# Patient Record
Sex: FEMALE | Race: NATIVE HAWAIIAN OR OTHER PACIFIC ISLANDER | NOT HISPANIC OR LATINO | Employment: STUDENT | URBAN - METROPOLITAN AREA
[De-identification: names, ages, dates, MRNs, and addresses within clinical notes are randomized per-mention and may not be internally consistent; named-entity substitution may affect disease eponyms.]

---

## 2021-10-01 LAB
ALT SERPL-CCNC: 15 U/L (ref 14–59)
AST SERPL-CCNC: 14 U/L (ref 0–45)
CREATININE (EXTERNAL): 0.78 MG/DL (ref 0.55–1.02)
GFR ESTIMATED (EXTERNAL): 130 ML/MIN/1.73M2
GFR ESTIMATED (IF AFRICAN AMERICAN) (EXTERNAL): 158 ML/MIN/1.73M2
GLUCOSE (EXTERNAL): 99 MG/DL (ref 70–124)
POTASSIUM (EXTERNAL): 4.5 MMOL/L (ref 3.4–5.3)

## 2021-12-15 LAB
ALT SERPL-CCNC: 19 U/L (ref 14–59)
AST SERPL-CCNC: 15 U/L (ref 0–45)
CREATININE (EXTERNAL): 0.73 MG/DL (ref 0.55–1.02)
GFR ESTIMATED (EXTERNAL): 140 ML/MIN/1.73M2
GFR ESTIMATED (IF AFRICAN AMERICAN) (EXTERNAL): 170 ML/MIN/1.73M2
GLUCOSE (EXTERNAL): 92 MG/DL (ref 70–124)
POTASSIUM (EXTERNAL): 4.5 MMOL/L (ref 3.4–5.3)

## 2022-01-03 ENCOUNTER — MEDICAL CORRESPONDENCE (OUTPATIENT)
Dept: HEALTH INFORMATION MANAGEMENT | Facility: CLINIC | Age: 27
End: 2022-01-03

## 2022-04-01 LAB
ALT SERPL-CCNC: 17 U/L (ref 14–59)
AST SERPL-CCNC: 14 U/L (ref 0–45)
CREATININE (EXTERNAL): 1.06 MG/DL (ref 0.55–1.02)
GFR ESTIMATED (EXTERNAL): >60 ML/MIN/1.73M2
GLUCOSE (EXTERNAL): 108 MG/DL (ref 70–124)
POTASSIUM (EXTERNAL): 4.8 MMOL/L (ref 3.4–5.3)

## 2022-09-30 ENCOUNTER — TELEPHONE (OUTPATIENT)
Dept: PLASTIC SURGERY | Facility: CLINIC | Age: 27
End: 2022-09-30

## 2022-09-30 DIAGNOSIS — F64.0 GENDER DYSPHORIA IN ADOLESCENT AND ADULT: Primary | ICD-10-CM

## 2022-09-30 NOTE — TELEPHONE ENCOUNTER
[facility] :  Care Coordination Note     SITUATION   Hernando Brunner (she/her) is a 26 year old adult who is receiving support for:  Care Team  .    BACKGROUND     Pt is scheduled for orchiectomy consultation with Dr. Gonzalez on 11/04/22 at 8:45 am.     Pt reports 1 year and 4 months on HRT.     ASSESSMENT     Surgery              [facility] Assessment  [facility] Enrollment: Enrolled  Patient has a therapist: Yes  Name of therapist: Prisca Huggins  Letter of support #1: Requested  Letter of support #2: Requested  Surgery being considered: Yes  Orchiectomy: Yes  Orchiectomy completed: No          PLAN          Nursing Interventions:      Follow-up plan:    1. Obtain 2 LOS  2. Writer to send  referrals via [employer].       Abigail Johnston

## 2022-10-03 ENCOUNTER — HEALTH MAINTENANCE LETTER (OUTPATIENT)
Age: 27
End: 2022-10-03

## 2022-10-04 NOTE — TELEPHONE ENCOUNTER
Action 2022 JTV 3:41 PM    Action Taken CSS called patient. Patient did not . [facility] LVM for patient to return call.     [facility] sent an urgent request to [facility] for records.      Action 2022 JTV 8:38 AM    Action Taken Patient returned call and confirmed that he only has one letter from a therapist that will be completed this week. Patient states he will fax over the letter once it is completed. Patient will contact [facility] to get his records released.      Action 2022 JTV 11:47 AM    Action Taken CSS called patient. Patient is meeting with her Therapist on Wednesday and will get the letter to the clinic after. [facility] emailed the patient a blank [facility] form to complete for [facility].     [facility] sent a request to [facility] for records.      Action 2022 JTV 3:55 PM    Action Taken Records received from [facility]. Records were sent to [facility].      MEDICAL RECORDS REQUEST   [facility]  Urology Clinic  [address] [place] [address]  PHONE: [phone]  Fax: [phone]        FUTURE VISIT INFORMATION                                                   Fa Leatha Brunner, : 1995 scheduled for future visit at [facility]    APPOINTMENT INFORMATION:    Date: 2022    Provider:  Stuart Gonzalez MD    Reason for Visit/Diagnosis: orchiectomy    REFERRAL INFORMATION:    Referring provider:  Stuart Gonzalez MD in [facility]    RECORDS REQUESTED FOR VISIT                                                     NOTES  STATUS/DETAILS   OFFICE NOTE from other specialist  yes, 10/17/2022, 10/10/2022, 2022, 2022, 10/11/2021 -- Cyndee Rhodes PA-C @ [facility]    2022 -- Mary Mitchell MD @ [email or web address]    10/24/2022 -- Prisca MART @ [facility]    MEDICATION LIST  yes     PRE-VISIT CHECKLIST      Record collection complete yes   Appointment appropriately scheduled            (right time/right provider) Yes   Joint diagnostic appointment coordinated correctly          (ensure right order & amount of time) Yes   MyChart activation Yes   Questionnaire complete If no, please explain pending

## 2022-10-06 ENCOUNTER — TRANSFERRED RECORDS (OUTPATIENT)
Dept: HEALTH INFORMATION MANAGEMENT | Facility: CLINIC | Age: 27
End: 2022-10-06

## 2022-10-06 LAB
ALT SERPL-CCNC: 21 U/L (ref 14–59)
AST SERPL-CCNC: 11 U/L (ref 0–45)
CREATININE (EXTERNAL): 0.77 MG/DL (ref 0.55–1.02)
GFR ESTIMATED (EXTERNAL): >60 ML/MIN/1.73M2
GLUCOSE (EXTERNAL): 92 MG/DL (ref 70–124)
POTASSIUM (EXTERNAL): 4.3 MMOL/L (ref 3.4–5.3)

## 2022-10-17 ENCOUNTER — TRANSFERRED RECORDS (OUTPATIENT)
Dept: HEALTH INFORMATION MANAGEMENT | Facility: CLINIC | Age: 27
End: 2022-10-17

## 2022-10-17 LAB — PHQ9 SCORE: 3

## 2022-10-24 ENCOUNTER — MEDICAL CORRESPONDENCE (OUTPATIENT)
Dept: HEALTH INFORMATION MANAGEMENT | Facility: CLINIC | Age: 27
End: 2022-10-24

## 2022-11-04 ENCOUNTER — OFFICE VISIT (OUTPATIENT)
Dept: UROLOGY | Facility: CLINIC | Age: 27
End: 2022-11-04
Payer: COMMERCIAL

## 2022-11-04 ENCOUNTER — PRE VISIT (OUTPATIENT)
Dept: UROLOGY | Facility: CLINIC | Age: 27
End: 2022-11-04

## 2022-11-04 VITALS
BODY MASS INDEX: 19.63 KG/M2 | DIASTOLIC BLOOD PRESSURE: 69 MMHG | HEIGHT: 64 IN | HEART RATE: 74 BPM | SYSTOLIC BLOOD PRESSURE: 103 MMHG | WEIGHT: 115 LBS

## 2022-11-04 DIAGNOSIS — F64.9 GENDER DYSPHORIA: Primary | ICD-10-CM

## 2022-11-04 DIAGNOSIS — F64.0 TRANSSEXUALISM: ICD-10-CM

## 2022-11-04 PROCEDURE — 99205 OFFICE O/P NEW HI 60 MIN: CPT | Mod: KX | Performed by: UROLOGY

## 2022-11-04 RX ORDER — ESTRADIOL 0.5 MG/1
TABLET ORAL
COMMUNITY
Start: 2022-10-17

## 2022-11-04 RX ORDER — SPIRONOLACTONE 50 MG/1
TABLET, FILM COATED ORAL
COMMUNITY
Start: 2021-05-29

## 2022-11-04 RX ORDER — SPIRONOLACTONE 100 MG/1
TABLET, FILM COATED ORAL
COMMUNITY
Start: 2022-10-17

## 2022-11-04 RX ORDER — ESTRADIOL 2 MG/1
TABLET ORAL
COMMUNITY
Start: 2022-10-17

## 2022-11-04 NOTE — LETTER
Date:November 7, 2022      Provider requested that no letter be sent. Do not send.       [facility]

## 2022-11-04 NOTE — LETTER
11/4/2022       RE: Jose Brunner  [address]  [facility] [address]     Dear Colleague,    Thank you for referring your patient, Jose Brunner, to the [facility] [place] at [facility]. Please see a copy of my visit note below.    [facility] Consult H&P    Name: Jose Brunner    MRN: [id number]   YOB: 1995                 Chief Complaint:   Gender Dysphoria          History of Present Illness:   Jose Brunner is a 26 year old transgender female seen in consultation for gender dysphoria    Patient transitioned May 2021  Preferred pronouns are: she her  The patient has been on exogenous hormones since: May 2021.  Patient is sexually attracted to: mostly women. Not in a relationship currently  In terms of fertility, the patient: has no children. Did not sperm.     The patient has obtained letters of support x 1. It's in epic.  Has an apt today for 2nd letter.     The patient has previously undergone no prior surgeries for gender or otherwise.  Long-term surgical goals for the patient include: full depth vaginoplasty    The patient is here today expressing interest in orchiectomy for now, but ultimately full depth vaginoplasty.         Past Medical History:   No past medical history on file.         Past Surgical History:   No past surgical history on file.         Social History:     Social History     Tobacco Use     Smoking status: Never     Smokeless tobacco: Never   Substance Use Topics     Alcohol use: Not on file            Family History:   No family history on file.           Allergies:   No Known Allergies         Medications:     Current Outpatient Medications   Medication Sig     estradiol (ESTRACE) 0.5 MG tablet TAKE 1 TABLET (0.5 MG) BY MOUTH ONCE DAILY IN THE MORNING WITH THE 2 MG TABLET (2.5 MG TOTAL IN AM)     estradiol (ESTRACE) 2 MG tablet PLACE 1 TABLET UNDER TONGUE  "TWICE DAILY SUBLINGUALLY IN THE MORNING AND EVENING (TAKE WITH 0.5 MG TABLET IN THE AM)     spironolactone (ALDACTONE) 100 MG tablet TAKE ONE Tablet (100mg) BY MOUTH EVERY MORNING     spironolactone (ALDACTONE) 50 MG tablet      No current facility-administered medications for this visit.             Review of Systems:    ROS: 14 point ROS neg other than the symptoms noted above in the HPI.          Physical Exam:   /69   Pulse 74   Ht 1.626 m (5' 4\")   Wt 52.2 kg (115 lb)   BMI 19.74 kg/m    General: age-appropriate in NAD  HEENT: Head AT/NC, EOMI, CN Grossly intact  Resp: no respiratory distress, lung sounds clear.  CV: heart rate regular, S1, S2.  Lymph: No cervical, supraclavicular or axillary lymphadenopathy  Back: normal. Nontender.  Abdomen: non obese, soft, non-distended, non-tender. No organomegaly  : testicles normal without atrophy or masses and penis normal without urethral discharge; uncircumcised.  LE: no edema.   Neuro: grossly intact  Motor: excellent strength throughout  Skin: clear of rashes or ecchymoses.        Outside records:    I spent 10 minutes reviewing outside records.         Assessment and Plan:   26 year old transgender female with gender dysphoria    The criteria for genital surgery are specific to the type of surgery being requested.  Criteria for bottom surgery:    1. Persistent, well documented gender dysphoria;  2. Capacity to make a fully informed decision and to consent for treatment;  3. Age of consent (>18 years old)  4. If significant medical or mental health concerns are present, they must be well controlled.  5. 12 continuous months of hormone therapy as appropriate to the patient s gender goals (unless  the patient has a medical contraindication or is otherwise unable or unwilling to take  Hormones).  6. Two letters of support    The aim of hormone therapy prior to gonadectomy is primarily to introduce a period of reversible  estrogen or testosterone " suppression, before the patient undergoes irreversible surgical intervention.    I reviewed the steps of orchiectomy. I reviewed the surgical procedure. I reviewed the risks and benefits including bleeding, infection and irreversible nature of the procedure.     Hair removal is a requirement prior to full depth vaginoplasty as the genital skin will be placed in the vaginal cavity. Lack of hair removal would lead to complications related to intravaginal hair. This is nearly impossible to remove postoperatively.    She has a persistent, well documented gender dysphoria. She has capacity to make a fully informed decision and to consent for treatment. Her mental health issues are well controlled. She has been on continuous hormones for years. She has one letters of support.     The patient meets all of these criteria. We discussed that gender affirmation surgery should be considered permanent. We discussed risks/complications of rectal injury, rectovaginal fistula, bleeding, fluid collection, infection, injury to surrounding structures, flap loss, sensory loss, wound dehiscence, vaginal prolapse, vaginal shrinkage/stenosis, need for lifelong dilation, urinary stream abnormalities, DVT/PE and need for revision surgery.     We discussed the option for minimal depth and full depth.      We also discussed the need to stop hormones yaya-procedurally for 2 weeks before and after surgery.     We discussed that transgender vaginoplasty for this patient would include: penectomy, orchiectomy, clitoroplasty, labiaplasty, urethral reconstruction, creation of a vagina, skin graft, colpopexy to suspend the vagina, and scrotectomy.     Wants orchiectomy first.  Needs 2nd letter of support  Will also have [facility] reach out re: hair removal resources.    Stuart Gonzalez MD   Reconstructive Urology  [facility]      60 minutes spent on the date of the encounter doing chart review, history and  exam, documentation and further activities per the note                 Again, thank you for allowing me to participate in the care of your patient.      Sincerely,    Stuart Gonzalez MD

## 2022-11-04 NOTE — NURSING NOTE
"Chief Complaint   Patient presents with     Consult     orchiectomy       Blood pressure 103/69, pulse 74, height 1.626 m (5' 4\"), weight 52.2 kg (115 lb). Body mass index is 19.74 kg/m .    There is no problem list on file for this patient.      No Known Allergies    Current Outpatient Medications   Medication Sig Dispense Refill     estradiol (ESTRACE) 0.5 MG tablet TAKE 1 TABLET (0.5 MG) BY MOUTH ONCE DAILY IN THE MORNING WITH THE 2 MG TABLET (2.5 MG TOTAL IN AM)       estradiol (ESTRACE) 2 MG tablet PLACE 1 TABLET UNDER TONGUE TWICE DAILY SUBLINGUALLY IN THE MORNING AND EVENING (TAKE WITH 0.5 MG TABLET IN THE AM)       spironolactone (ALDACTONE) 100 MG tablet TAKE ONE Tablet (100mg) BY MOUTH EVERY MORNING       spironolactone (ALDACTONE) 50 MG tablet          Social History     Tobacco Use     Smoking status: Never     Smokeless tobacco: Never       Gamal Bryan EMT  11/4/2022  8:42 AM  "

## 2022-11-04 NOTE — PROGRESS NOTES
[facility] Consult H&P    Name: Jose Brunner    MRN: [id number]   YOB: 1995                 Chief Complaint:   Gender Dysphoria          History of Present Illness:   Jose Brunner is a 26 year old transgender female seen in consultation for gender dysphoria    Patient transitioned May 2021  Preferred pronouns are: she her  The patient has been on exogenous hormones since: May 2021.  Patient is sexually attracted to: mostly women. Not in a relationship currently  In terms of fertility, the patient: has no children. Did not sperm.     The patient has obtained letters of support x 1. It's in epic.  Has an apt today for 2nd letter.     The patient has previously undergone no prior surgeries for gender or otherwise.  Long-term surgical goals for the patient include: full depth vaginoplasty    The patient is here today expressing interest in orchiectomy for now, but ultimately full depth vaginoplasty.         Past Medical History:   No past medical history on file.         Past Surgical History:   No past surgical history on file.         Social History:     Social History     Tobacco Use     Smoking status: Never     Smokeless tobacco: Never   Substance Use Topics     Alcohol use: Not on file            Family History:   No family history on file.           Allergies:   No Known Allergies         Medications:     Current Outpatient Medications   Medication Sig     estradiol (ESTRACE) 0.5 MG tablet TAKE 1 TABLET (0.5 MG) BY MOUTH ONCE DAILY IN THE MORNING WITH THE 2 MG TABLET (2.5 MG TOTAL IN AM)     estradiol (ESTRACE) 2 MG tablet PLACE 1 TABLET UNDER TONGUE TWICE DAILY SUBLINGUALLY IN THE MORNING AND EVENING (TAKE WITH 0.5 MG TABLET IN THE AM)     spironolactone (ALDACTONE) 100 MG tablet TAKE ONE Tablet (100mg) BY MOUTH EVERY MORNING     spironolactone (ALDACTONE) 50 MG tablet      No current facility-administered medications for this visit.             Review of Systems:  "   ROS: 14 point ROS neg other than the symptoms noted above in the HPI.          Physical Exam:   /69   Pulse 74   Ht 1.626 m (5' 4\")   Wt 52.2 kg (115 lb)   BMI 19.74 kg/m    General: age-appropriate in NAD  HEENT: Head AT/NC, EOMI, CN Grossly intact  Resp: no respiratory distress, lung sounds clear.  CV: heart rate regular, S1, S2.  Lymph: No cervical, supraclavicular or axillary lymphadenopathy  Back: normal. Nontender.  Abdomen: non obese, soft, non-distended, non-tender. No organomegaly  : testicles normal without atrophy or masses and penis normal without urethral discharge; uncircumcised.  LE: no edema.   Neuro: grossly intact  Motor: excellent strength throughout  Skin: clear of rashes or ecchymoses.        Outside records:    I spent 10 minutes reviewing outside records.         Assessment and Plan:   26 year old transgender female with gender dysphoria    The criteria for genital surgery are specific to the type of surgery being requested.  Criteria for bottom surgery:    1. Persistent, well documented gender dysphoria;  2. Capacity to make a fully informed decision and to consent for treatment;  3. Age of consent (>18 years old)  4. If significant medical or mental health concerns are present, they must be well controlled.  5. 12 continuous months of hormone therapy as appropriate to the patient s gender goals (unless  the patient has a medical contraindication or is otherwise unable or unwilling to take  Hormones).  6. Two letters of support    The aim of hormone therapy prior to gonadectomy is primarily to introduce a period of reversible  estrogen or testosterone suppression, before the patient undergoes irreversible surgical intervention.    I reviewed the steps of orchiectomy. I reviewed the surgical procedure. I reviewed the risks and benefits including bleeding, infection and irreversible nature of the procedure.     Hair removal is a requirement prior to full depth vaginoplasty as the " genital skin will be placed in the vaginal cavity. Lack of hair removal would lead to complications related to intravaginal hair. This is nearly impossible to remove postoperatively.    She has a persistent, well documented gender dysphoria. She has capacity to make a fully informed decision and to consent for treatment. Her mental health issues are well controlled. She has been on continuous hormones for years. She has one letters of support.     The patient meets all of these criteria. We discussed that gender affirmation surgery should be considered permanent. We discussed risks/complications of rectal injury, rectovaginal fistula, bleeding, fluid collection, infection, injury to surrounding structures, flap loss, sensory loss, wound dehiscence, vaginal prolapse, vaginal shrinkage/stenosis, need for lifelong dilation, urinary stream abnormalities, DVT/PE and need for revision surgery.     We discussed the option for minimal depth and full depth.      We also discussed the need to stop hormones yaya-procedurally for 2 weeks before and after surgery.     We discussed that transgender vaginoplasty for this patient would include: penectomy, orchiectomy, clitoroplasty, labiaplasty, urethral reconstruction, creation of a vagina, skin graft, colpopexy to suspend the vagina, and scrotectomy.     Wants orchiectomy first.  Needs 2nd letter of support  Will also have [facility] reach out re: hair removal resources.    Stuart Gonzalez MD   Reconstructive Urology  [facility]      60 minutes spent on the date of the encounter doing chart review, history and exam, documentation and further activities per the note

## 2023-05-22 ENCOUNTER — TELEPHONE (OUTPATIENT)
Dept: UROLOGY | Facility: CLINIC | Age: 28
End: 2023-05-22
Payer: COMMERCIAL

## 2023-05-22 NOTE — TELEPHONE ENCOUNTER
[facility] Call Center    Phone Message    May a detailed message be left on voicemail: yes     Reason for Call: Patient would like to schedule orchiectomy. Please reach out.    Action Taken: Message routed to:  [facility] Urology    Travel Screening: Not Applicable

## 2023-05-23 NOTE — CONFIDENTIAL NOTE
Writer called pt back re: request for orchiectomy. Pt had a consult with Dr. Gonzalez in November 2022. After reviewing her chart, pt did not have second LOS in. Writer gave instructions to upload to [employer] and sent a message. Pt to upload second LOS, have them reviewed by , and then Dr. Gonzalez to submit orders for surgery.

## 2023-06-13 ENCOUNTER — DOCUMENTATION ONLY (OUTPATIENT)
Dept: PLASTIC SURGERY | Facility: CLINIC | Age: 28
End: 2023-06-13
Payer: COMMERCIAL

## 2023-10-22 ENCOUNTER — HEALTH MAINTENANCE LETTER (OUTPATIENT)
Age: 28
End: 2023-10-22

## 2024-12-15 ENCOUNTER — HEALTH MAINTENANCE LETTER (OUTPATIENT)
Age: 29
End: 2024-12-15